# Patient Record
Sex: FEMALE | Race: WHITE | URBAN - METROPOLITAN AREA
[De-identification: names, ages, dates, MRNs, and addresses within clinical notes are randomized per-mention and may not be internally consistent; named-entity substitution may affect disease eponyms.]

---

## 2017-01-25 ENCOUNTER — TELEPHONE (OUTPATIENT)
Dept: CARDIOLOGY CLINIC | Age: 57
End: 2017-01-25

## 2017-05-08 ENCOUNTER — TELEPHONE (OUTPATIENT)
Dept: CARDIOLOGY CLINIC | Age: 57
End: 2017-05-08

## 2017-10-16 RX ORDER — ISOSORBIDE MONONITRATE 30 MG/1
TABLET, EXTENDED RELEASE ORAL
Qty: 45 TABLET | Refills: 3 | Status: SHIPPED | OUTPATIENT
Start: 2017-10-16 | End: 2018-10-10 | Stop reason: SDUPTHER

## 2017-11-08 ENCOUNTER — OFFICE VISIT (OUTPATIENT)
Dept: CARDIOLOGY CLINIC | Age: 57
End: 2017-11-08

## 2017-11-08 VITALS
WEIGHT: 186.6 LBS | SYSTOLIC BLOOD PRESSURE: 130 MMHG | DIASTOLIC BLOOD PRESSURE: 80 MMHG | BODY MASS INDEX: 33.06 KG/M2 | HEART RATE: 68 BPM | OXYGEN SATURATION: 95 % | HEIGHT: 63 IN

## 2017-11-08 DIAGNOSIS — E78.00 PURE HYPERCHOLESTEROLEMIA: ICD-10-CM

## 2017-11-08 DIAGNOSIS — Z01.810 PREOP CARDIOVASCULAR EXAM: ICD-10-CM

## 2017-11-08 DIAGNOSIS — I25.10 CORONARY ARTERY DISEASE INVOLVING NATIVE CORONARY ARTERY OF NATIVE HEART WITHOUT ANGINA PECTORIS: Primary | ICD-10-CM

## 2017-11-08 PROCEDURE — 99214 OFFICE O/P EST MOD 30 MIN: CPT | Performed by: INTERNAL MEDICINE

## 2017-11-08 PROCEDURE — 93000 ELECTROCARDIOGRAM COMPLETE: CPT | Performed by: INTERNAL MEDICINE

## 2017-11-08 RX ORDER — MELOXICAM 15 MG/1
15 TABLET ORAL DAILY
COMMUNITY
End: 2018-03-21

## 2017-11-08 RX ORDER — ALBUTEROL SULFATE 90 UG/1
2 AEROSOL, METERED RESPIRATORY (INHALATION) EVERY 6 HOURS PRN
COMMUNITY

## 2017-11-08 RX ORDER — MECLIZINE HYDROCHLORIDE 25 MG/1
25 TABLET ORAL 3 TIMES DAILY PRN
Qty: 210 TABLET | Refills: 1 | Status: SHIPPED | OUTPATIENT
Start: 2017-11-08 | End: 2017-11-15

## 2017-11-08 ASSESSMENT — ENCOUNTER SYMPTOMS
WHEEZING: 0
NAUSEA: 1
COUGH: 0
EYE PAIN: 0
VOMITING: 1
EYE REDNESS: 0
ABDOMINAL PAIN: 0
CHEST TIGHTNESS: 0
SHORTNESS OF BREATH: 0
COLOR CHANGE: 0
BLOOD IN STOOL: 0

## 2017-11-08 NOTE — PATIENT INSTRUCTIONS
Patient Education        Vertigo: Care Instructions  Your Care Instructions  Vertigo is the feeling that you or your surroundings are moving when there is no actual movement. It is often described as a feeling of spinning, whirling, falling, or tilting. Vertigo may make you vomit or feel nauseated. You may have trouble standing or walking and may lose your balance. Vertigo is often related to an inner ear problem, but it can have other more serious causes. If vertigo continues, you may need more tests to find its cause. Follow-up care is a key part of your treatment and safety. Be sure to make and go to all appointments, and call your doctor if you are having problems. Its also a good idea to know your test results and keep a list of the medicines you take. How can you care for yourself at home? · Do not lie flat on your back. Prop yourself up slightly. This may reduce the spinning feeling. Keep your eyes open. · Move slowly so that you do not fall. · If your doctor recommends medicine, take it exactly as directed. · Do not drive while you are having vertigo. Certain exercises, called Guillermo-Daroff exercises, can help decrease vertigo. To do Guillermo-Daroff exercises:  · Sit on the edge of a bed or sofa and quickly lie down on the side that causes the worst vertigo. Lie on your side with your ear down. · Stay in this position for at least 30 seconds or until the vertigo goes away. · Sit up. If this causes vertigo, wait for it to stop. · Repeat the procedure on the other side. · Repeat this 10 times. Do these exercises 2 times a day until the vertigo is gone. When should you call for help? Call 911 anytime you think you may need emergency care. For example, call if:  · You passed out (lost consciousness). · You have symptoms of a stroke. These may include:  ¨ Sudden numbness, tingling, weakness, or loss of movement in your face, arm, or leg, especially on only one side of your body.   ¨ Sudden vision

## 2017-11-08 NOTE — LETTER
[address] [facility] - [address] [address]  [address]   [address] [address]  Phone: [phone]  Fax: [phone]    Godfrey Sales MD        November 20, 2017     Bisi Villalobos MD  [address] [place] [address] Road  [facility] [address]    Patient: Olga De La Cruz  MR Number: [id number]  YOB: 1960  Date of Visit: 11/8/2017    Dear Dr. Bisi Villalobos:    HPI Olga De La Cruz is here for preop risk assessment prior to having foot sx. While in the office today she felt dizzy,  nauseated, vomited and felt as if she was going to pass out. She states that the room felt as if it was spinning. She states that this morning at home while sitting on the bed she felt dizzy and nearly passed out. She denies chest pain, palpitations,  syncope, leg swelling and increased dyspnea. Assessment:       CAD (coronary artery disease)       Cath 11/28/12 mild proximal RCA dz, normal LVE. Echo 9/2015 LVEF normal, no significant valvular dz. GXT 3/2016 no ischemia. Unable to tolerate BB.  Hyperlipidemia       On statin. LDL 64.    Tobacco use disorder      cessation discussed, continues to smoke. Chantix did not help her. Quit smoking 9/2017    Palpitations -  Holter monitor normal 5/11.        stable    Chest pain      Atypical. Chronic. Shortness of breath -stable       Near syncope associated with vomiting. Preop risk assessment for foot surgery. EKG NSR. Dizziness, likely vertigo. No orthostatic changes. Plan:      Clinically stable from cardiac standpoint. May proceed with foot surgery. Considered to be at average risk. Cover with nitrates perioperatively. May stop ASA if indicated 5 days prior to procedure and resume ASAP postoperatively. Will prescribe Antivert for vertigo. Fasting lipid profile, CMP prior to next visit. If you have questions, please do not hesitate to call me. I look forward to following Juancho López along with you.     Sincerely,        Godfrey Sales MD

## 2017-11-08 NOTE — PROGRESS NOTES
perioperatively. May stop ASA if indicated 5 days prior to procedure and resume ASAP postoperatively. Will prescribe Antivert for vertigo. Fasting lipid profile, CMP prior to next visit.

## 2017-11-20 NOTE — COMMUNICATION BODY
HPI Shankar Jasmine is here for preop risk assessment prior to having foot sx. While in the office today she felt dizzy,  nauseated, vomited and felt as if she was going to pass out. She states that the room felt as if it was spinning. She states that this morning at home while sitting on the bed she felt dizzy and nearly passed out. She denies chest pain, palpitations,  syncope, leg swelling and increased dyspnea. Assessment:       CAD (coronary artery disease)       Cath 11/28/12 mild proximal RCA dz, normal LVE. Echo 9/2015 LVEF normal, no significant valvular dz. GXT 3/2016 no ischemia. Unable to tolerate BB.  Hyperlipidemia       On statin. LDL 64.    Tobacco use disorder      cessation discussed, continues to smoke. Chantix did not help her. Quit smoking 9/2017    Palpitations -  Holter monitor normal 5/11.        stable    Chest pain      Atypical. Chronic. Shortness of breath -stable       Near syncope associated with vomiting. Preop risk assessment for foot surgery. EKG NSR. Dizziness, likely vertigo. No orthostatic changes. Plan:      Clinically stable from cardiac standpoint. May proceed with foot surgery. Considered to be at average risk. Cover with nitrates perioperatively. May stop ASA if indicated 5 days prior to procedure and resume ASAP postoperatively. Will prescribe Antivert for vertigo. Fasting lipid profile, CMP prior to next visit.

## 2018-01-19 RX ORDER — ROSUVASTATIN CALCIUM 40 MG/1
40 TABLET, COATED ORAL EVERY EVENING
Qty: 90 TABLET | Refills: 1 | Status: SHIPPED | OUTPATIENT
Start: 2018-01-19 | End: 2018-09-10 | Stop reason: SDUPTHER

## 2018-03-21 ENCOUNTER — OFFICE VISIT (OUTPATIENT)
Dept: CARDIOLOGY CLINIC | Age: 58
End: 2018-03-21

## 2018-03-21 VITALS
BODY MASS INDEX: 34.96 KG/M2 | OXYGEN SATURATION: 97 % | HEART RATE: 76 BPM | SYSTOLIC BLOOD PRESSURE: 112 MMHG | DIASTOLIC BLOOD PRESSURE: 70 MMHG | HEIGHT: 62 IN | WEIGHT: 190 LBS

## 2018-03-21 DIAGNOSIS — E78.1 PURE HYPERGLYCERIDEMIA: ICD-10-CM

## 2018-03-21 DIAGNOSIS — R00.2 PALPITATIONS: ICD-10-CM

## 2018-03-21 DIAGNOSIS — I25.10 CORONARY ARTERY DISEASE INVOLVING NATIVE CORONARY ARTERY OF NATIVE HEART WITHOUT ANGINA PECTORIS: Primary | ICD-10-CM

## 2018-03-21 PROCEDURE — 99214 OFFICE O/P EST MOD 30 MIN: CPT | Performed by: INTERNAL MEDICINE

## 2018-03-21 RX ORDER — MECLIZINE HYDROCHLORIDE 25 MG/1
25 TABLET ORAL 3 TIMES DAILY PRN
COMMUNITY

## 2018-03-21 RX ORDER — NITROGLYCERIN 0.4 MG/1
0.4 TABLET SUBLINGUAL EVERY 5 MIN PRN
Qty: 25 TABLET | Refills: 2 | Status: SHIPPED | OUTPATIENT
Start: 2018-03-21

## 2018-03-21 ASSESSMENT — ENCOUNTER SYMPTOMS
SHORTNESS OF BREATH: 1
WHEEZING: 0
COUGH: 0
EYE REDNESS: 0
BLOOD IN STOOL: 0
CHEST TIGHTNESS: 0
COLOR CHANGE: 0
ABDOMINAL PAIN: 0
EYE PAIN: 0

## 2018-03-21 NOTE — PATIENT INSTRUCTIONS
Patient Education        High Cholesterol: Care Instructions  Your Care Instructions    Cholesterol is a type of fat in your blood. It is needed for many body functions, such as making new cells. Cholesterol is made by your body. It also comes from food you eat. High cholesterol means that you have too much of the fat in your blood. This raises your risk of a heart attack and stroke. LDL and HDL are part of your total cholesterol. LDL is the \"bad\" cholesterol. High LDL can raise your risk for heart disease, heart attack, and stroke. HDL is the \"good\" cholesterol. It helps clear bad cholesterol from the body. High HDL is linked with a lower risk of heart disease, heart attack, and stroke. Your cholesterol levels help your doctor find out your risk for having a heart attack or stroke. You and your doctor can talk about whether you need to lower your risk and what treatment is best for you. A heart-healthy lifestyle along with medicines can help lower your cholesterol and your risk. The way you choose to lower your risk will depend on how high your risk is for heart attack and stroke. It will also depend on how you feel about taking medicines. Follow-up care is a key part of your treatment and safety. Be sure to make and go to all appointments, and call your doctor if you are having problems. It's also a good idea to know your test results and keep a list of the medicines you take. How can you care for yourself at home? · Eat a variety of foods every day. Good choices include fruits, vegetables, whole grains (like oatmeal), dried beans and peas, nuts and seeds, soy products (like tofu), and fat-free or low-fat dairy products. · Replace butter, margarine, and hydrogenated or partially hydrogenated oils with olive and canola oils. (Canola oil margarine without trans fat is fine.)  · Replace red meat with fish, poultry, and soy protein (like tofu).   · Limit processed and packaged foods like chips, crackers, and cookies. · Bake, broil, or steam foods. Don't escamilla them. · Be physically active. Get at least 30 minutes of exercise on most days of the week. Walking is a good choice. You also may want to do other activities, such as running, swimming, cycling, or playing tennis or team sports. · Stay at a healthy weight or lose weight by making the changes in eating and physical activity listed above. Losing just a small amount of weight, even 5 to 10 pounds, can reduce your risk for having a heart attack or stroke. · Do not smoke. When should you call for help? Watch closely for changes in your health, and be sure to contact your doctor if:  ? · You need help making lifestyle changes. ? · You have questions about your medicine. Where can you learn more? Go to [email or web address] and sign in to your [employer] account. Enter B603 in the [employer] box to learn more about \"High Cholesterol: Care Instructions. \"     If you do not have an account, please click on the \"Sign Up Now\" link. Current as of: September 21, 2016  Content Version: 11.5  © 0558-4583 Healthwise, Incorporated. Care instructions adapted under license by [facility] ([facility]). If you have questions about a medical condition or this instruction, always ask your healthcare professional. [address] any warranty or liability for your use of this information.

## 2018-03-21 NOTE — LETTER
All other systems reviewed and are negative. Assessment:       CAD (coronary artery disease)       Cath 2008 at [facility] showed 60% blockage per pt. Cath 11/28/12 mild proximal RCA dz, normal LVE. Echo 9/2015 LVEF normal, no significant valvular dz. GXT 3/2016 no ischemia. Unable to tolerate BB. Nuc GXT 3/2018 normal. Echo 3/2018 normal.    Hyperlipidemia       On statin. No recent lipids    Tobacco use disorder      cessation discussed, continues to smoke. Chantix did not help her. Quit smoking 9/2017    Palpitations -  Holter monitor normal 5/11.        stable    Chest pain      Atypical. Chronic. CTA chest 3/2018 no PE        Shortness of breath -stable       Near syncope associated with vomiting. Dizziness, likely vertigo. No orthostatic changes. Plan:      Clinically stable. No evidence of CHF. No exertional angina. Atypical chest pain. Recent stress test showed no ischemia. Continue ASA and statin. Risk factor modification also discussed. Advised to call if she has exertional angina once able to ambulate more. Will consider angiogram if she has exertional symptoms. Fasting lipid profile, CMP prior to next visit. May reduce Imdur dosage if SBP <90mmHg. If you have questions, please do not hesitate to call me. I look forward to following Charles Mcmanus along with you.     Sincerely,        Arthur Pollard MD

## 2018-04-12 PROBLEM — Z01.810 PREOP CARDIOVASCULAR EXAM: Status: RESOLVED | Noted: 2017-11-08 | Resolved: 2018-04-12

## 2018-04-18 NOTE — COMMUNICATION BODY
HPI. Leigh Ann Vicente is here for f/u of her recent hospitalization for chest pain. Stress test and echo were normal. Today she denies exertional chest pain, palpitations, dizziness, syncope, leg swelling and increased dyspnea. She continues to have chest discomfort but less severe after discharged from the hospital. Her chest discomfort is described as a \"pressure and squeezing\" that occurs 3-4 times a day and lasts <1-2 minutes. The discomfort occurs randomly. She recently started walking again and is no longer using the wheelchair. She continues to wear a boot on her right foot. She does not think that she has chest pain when walking. She states that her PCP wants her to have an angiogram.     Review of Systems   Constitutional: Negative for activity change, appetite change, diaphoresis and fatigue. HENT: Negative for nosebleeds and tinnitus. Eyes: Negative for pain and redness. Respiratory: Positive for shortness of breath. Negative for cough, chest tightness and wheezing. Cardiovascular: Positive for chest pain. Negative for palpitations and leg swelling. Gastrointestinal: Negative for abdominal pain and blood in stool. Genitourinary: Negative for difficulty urinating, hematuria, pelvic pain and vaginal bleeding. Musculoskeletal: Negative for joint swelling, myalgias and neck pain. Skin: Negative for color change and pallor. Neurological: Negative for dizziness, syncope, numbness and headaches. Hematological: Does not bruise/bleed easily. Psychiatric/Behavioral: Negative for confusion and decreased concentration. All other systems reviewed and are negative. Assessment:       CAD (coronary artery disease)       Cath 2008 at [facility] showed 60% blockage per pt. Cath 11/28/12 mild proximal RCA dz, normal LVE. Echo 9/2015 LVEF normal, no significant valvular dz. GXT 3/2016 no ischemia. Unable to tolerate BB.  Nuc GXT 3/2018 normal. Echo 3/2018 normal.    Hyperlipidemia       On statin. No recent lipids    Tobacco use disorder      cessation discussed, continues to smoke. Chantix did not help her. Quit smoking 9/2017    Palpitations -  Holter monitor normal 5/11.        stable    Chest pain      Atypical. Chronic. CTA chest 3/2018 no PE        Shortness of breath -stable       Near syncope associated with vomiting. Dizziness, likely vertigo. No orthostatic changes. Plan:      Clinically stable. No evidence of CHF. No exertional angina. Atypical chest pain. Recent stress test showed no ischemia. Continue ASA and statin. Risk factor modification also discussed. Advised to call if she has exertional angina once able to ambulate more. Will consider angiogram if she has exertional symptoms. Fasting lipid profile, CMP prior to next visit. May reduce Imdur dosage if SBP <90mmHg.

## 2018-07-25 ENCOUNTER — OFFICE VISIT CONVERTED (OUTPATIENT)
Dept: SURGERY | Facility: CLINIC | Age: 58
End: 2018-07-25
Attending: SURGERY

## 2018-09-10 RX ORDER — ROSUVASTATIN CALCIUM 40 MG/1
40 TABLET, COATED ORAL EVERY EVENING
Qty: 90 TABLET | Refills: 3 | Status: SHIPPED | OUTPATIENT
Start: 2018-09-10 | End: 2020-02-28 | Stop reason: SDUPTHER

## 2018-10-10 RX ORDER — ISOSORBIDE MONONITRATE 30 MG/1
TABLET, EXTENDED RELEASE ORAL
Qty: 45 TABLET | Refills: 3 | Status: SHIPPED | OUTPATIENT
Start: 2018-10-10

## 2019-03-21 ENCOUNTER — HOSPITAL ENCOUNTER (OUTPATIENT)
Dept: GENERAL RADIOLOGY | Facility: HOSPITAL | Age: 59
Discharge: HOME | End: 2019-03-21
Attending: SURGERY

## 2019-03-25 ENCOUNTER — OFFICE VISIT CONVERTED (OUTPATIENT)
Dept: SURGERY | Facility: CLINIC | Age: 59
End: 2019-03-25
Attending: SURGERY

## 2020-02-28 RX ORDER — ROSUVASTATIN CALCIUM 40 MG/1
40 TABLET, COATED ORAL EVERY EVENING
Qty: 90 TABLET | Refills: 0 | Status: SHIPPED | OUTPATIENT
Start: 2020-02-28

## 2020-07-15 ENCOUNTER — HOSPITAL ENCOUNTER (OUTPATIENT)
Dept: GENERAL RADIOLOGY | Facility: HOSPITAL | Age: 60
Discharge: HOME | End: 2020-07-15
Attending: FAMILY MEDICINE

## 2020-07-22 ENCOUNTER — OFFICE VISIT CONVERTED (OUTPATIENT)
Dept: SURGERY | Facility: CLINIC | Age: 60
End: 2020-07-22
Attending: SURGERY

## 2020-07-22 ENCOUNTER — CONVERSION ENCOUNTER (OUTPATIENT)
Dept: SURGERY | Facility: CLINIC | Age: 60
End: 2020-07-22

## 2021-05-10 NOTE — H&P
"   History and Physical      Patient Name: Kajal Mixon   Patient ID: [id number]   Sex: Female   YOB: 1960    Primary Care Provider: Tiffany Anderson MD   Referring Provider: Tiffany Anderson MD    Visit Date: July 22, 2020    Provider: Kajal Latham MD   Location[facility]   Location Address: [address] [place] [address]  [id number]   Location Phone: [phone]          Chief Complaint  · Annual exam      History Of Present Illness  Kajal Mixon is a 59 year old /Black female who is referred from Tiffany Anderson MD ; very pleasant lady here with improved left breast pain and followup from annual mammogram. her annual imaging was benign with no masses or lesions.             Past Medical History  Breast cyst; Breast nodule         Past Surgical History  *Denies any surgical procedures         Allergy List  Latex       Allergies Reconciled  Family Medical History  *No Known Family History         Social History  Tobacco (Never)         Review of Systems  · Constitutional  o Denies  o : fever, chills  · Breasts  o * See HPI  · Cardiovascular  o Denies  o : chest pain, cardiac murmurs, irregular heart beats, dyspnea on exertion  · Integument  o Denies  o : rash, itching, new skin lesions  · Heme-Lymph  o Denies  o : easy bleeding, easy bruising, lymph node enlargement or tenderness      Vitals  Date Time BP Position Site L\R Cuff Size HR RR TEMP (F) WT  HT  BMI kg/m2 BSA m2 O2 Sat        07/22/2020 09:03 AM       14  173lbs 0oz 5'  4\" 29.7 1.88           Physical Examination  · Constitutional  o Appearance  o : A well-nourished, well-developed patient who ambulates without difficulty, Alert and Oriented X3.  · Respiratory  o Respiratory Effort  o : breathing unlabored  o Inspection of Chest  o : breaths equal bilaterally  · Breasts  o Inspection of Breasts  o : developmental state normal for age  o Palpation of Breasts, Axillae  o : no masses or tenderness noted on palpation, " no skin ornipple changes          Assessment  · Annual physical exam     V70.0/Z00.00      Plan  · Medications  o Medications have been Reconciled  o Transition of Care or Provider Policy  · Instructions  o PLAN:  o Routine screening mammogram in one year  · Referrals  o ID: [id number] Date: 07/20/2020 Type: Inbound  Specialty: General Surgery            Electronically Signed by: Kajal Latham MD -Author on July 22, 2020 09:08:51 AM

## 2021-05-15 VITALS — BODY MASS INDEX: 29.53 KG/M2 | RESPIRATION RATE: 14 BRPM | WEIGHT: 173 LBS | HEIGHT: 64 IN

## 2021-05-15 VITALS — RESPIRATION RATE: 14 BRPM | BODY MASS INDEX: 29.88 KG/M2 | WEIGHT: 175 LBS | HEIGHT: 64 IN

## 2021-05-16 VITALS — BODY MASS INDEX: 29.71 KG/M2 | RESPIRATION RATE: 16 BRPM | WEIGHT: 174 LBS | HEIGHT: 64 IN

## 2021-05-20 ENCOUNTER — OFFICE VISIT CONVERTED (OUTPATIENT)
Dept: SURGERY | Facility: CLINIC | Age: 61
End: 2021-05-20
Attending: NURSE PRACTITIONER

## 2021-05-20 ENCOUNTER — CONVERSION ENCOUNTER (OUTPATIENT)
Dept: SURGERY | Facility: CLINIC | Age: 61
End: 2021-05-20

## 2021-06-05 NOTE — H&P
History and Physical      Patient Name: Kajal Mixon   Patient ID: [id number]   Sex: Female   YOB: 1960    Primary Care Provider: Tiffany Anderson MD   Referring Provider: Tiffany Anderson MD    Visit Date: May 20, 2021    Provider: ERNA Manuel   Location: [facility]   Location Address: [address] [place] [address]  [id number]   Location Phone: [phone]          Chief Complaint  · Requesting colonoscopy  · Age 50 or over  · Blood in Stool  · Constipation  · Abdominal Pain      History Of Present Illness  The patient is a 60 year old /Black female presenting [facility] on a referral from Tiffany Anderson MD.   Kajal Mixon needs to have a diagnostic colonoscopy.   Patient states that they have had a colonoscopy. 11 years ago   Patient currently complains of: constipation, blood in stool, and abdominal pain   Patient Does not have family history of colon cancer.      Patient presents today on a referral from Dr. Tiffany Anderson for a blood in stool, constipation and lower abdominal pain. Patient reports that she has always battled constipation, and now is with some lower abdominal pain and seeing some BRBPR. Patient denies any family history of colorectal cancer.     Reports that her last colon was 11 years ago at [place] and she believes it was normal.       Past Medical History  Disease Name Date Onset Notes   Breast cyst --  --    Breast nodule --  --    Chest pain --  --    GERD --  --          Past Surgical History  Procedure Name Date Notes   *Denies any surgical procedures --  --    Colonoscopy --  --          Medication List  Name Date Started Instructions   Excedrin Migraine 250-250-65 mg oral tablet  take 1 tablet by oral route once daily as needed.   Fioricet -40 mg oral capsule  take 2 capsules by oral route every 4 hours as needed not to exceed 6 capsules per 24hrs   NuLYTELY with Flavor Packs 420 gram oral recon soln  "05/20/2021 take as directed   terbinafine 250 mg oral  take one tablet daily orally for 84 days.         Allergy List  Allergen Name Date Reaction Notes   Latex --  --  --    Latex Exam Gloves --  --  --        Allergies Reconciled  Family Medical History  Disease Name Relative/Age Notes   Diabetes, unspecified type Father/  Grandfather (paternal)/   Father; Grandfather (paternal)   *No Known Family History  --    No family history of colorectal cancer  --          Social History  Finding Status Start/Stop Quantity Notes   Alcohol Current some day --/-- --  05/20/2021 - wine occasionally   Tobacco Never --/-- --  --          Review of Systems  · Constitutional  o Denies  o : fever, chills  · Eyes  o Denies  o : yellowish discoloration of eyes  · HENT  o Denies  o : difficulty swallowing  · Cardiovascular  o Denies  o : chest pain, chest pain on exertion  · Respiratory  o Denies  o : shortness of breath  · Gastrointestinal  o Admits  o : constipation, abdominal pain, blood in stools  o Denies  o : nausea, vomiting, diarrhea  · Genitourinary  o Denies  o : abnormal color of urine  · Integument  o Denies  o : rash  · Neurologic  o Denies  o : tingling or numbness  · Musculoskeletal  o Denies  o : joint pain  · Endocrine  o Denies  o : weight gain, weight loss      Vitals  Date Time BP Position Site L\R Cuff Size HR RR TEMP (F) WT  HT  BMI kg/m2 BSA m2 O2 Sat FR L/min FiO2 HC       05/20/2021 10:16 AM       13  180lbs 8oz 5'  3.5\" 31.47 1.92             Physical Examination  · Constitutional  o Appearance  o : well developed, well-nourished, patient in no apparent distress  · Head and Face  o Head  o :   § Inspection  § : atraumatic, normocephalic  o Face  o :   § Inspection  § : no facial lesions  · Eyes  o Conjunctivae  o : conjunctivae normal  o Sclerae  o : sclerae white  · Neck  o Inspection/Palpation  o : normal appearance, no masses or tenderness, trachea midline  · Respiratory  o Respiratory Effort  o : " breathing unlabored  · Skin and Subcutaneous Tissue  o General Inspection  o : no lesions present, no areas of discoloration, skin turgor normal, texture normal  · Neurologic  o Mental Status Examination  o :   § Orientation  § : grossly oriented to person, place and time  § Attention  § : attention normal, concentration abilities normal  § Fund of Knowledge  § : fund of knowledge within normal limits, patient aware of current events  o Gait and Station  o : normal gait, able to stand without difficulty  · Psychiatric  o Judgement and Insight  o : judgment and insight intact  o Mood and Affect  o : mood normal, affect appropriate              Assessment  · Blood in the stool     578.1/K92.1  · Constipation     564.00/K59.00  · Abdominal pain, lower     789.09/R10.30    Problems Reconciled  Plan  · Orders  o Consent for Colonoscopy with Possible Biopsy - Possible risks/complications, benefits, and alternatives to surgical or invasive procedure have been explained to patient and/or legal guardian. -Patient has been evaluated and can tolerate anesthesia and/or sedation. Risks, benefits, and alternatives to anesthesia and sedation have been explained to patient and/or legal guardian. ([id number]) - 578.1/K92.1, 564.00/K59.00, 789.09/R10.30 - 06/17/2021  · Medications  o Medications have been Reconciled  o Transition of Care or Provider Policy  · Instructions  o Surgical Facility: [facility]  o Handouts Provided Pre-Procedure Instructions including date, time, and location of procedure.   o PLAN: Proceeed with colonoscopy. Patient understands risks/benefits and is willing to proceed.   o ***Surgical Orders***  o RISK AND BENEFITS:  o Given these options, the patient has verbally expressed an understanding of the risks of the surgery and finds these risks acceptable. Will proceed with surgery as soon as possible.  o O.R. PREP: Per protocol   o IV: Per Anesthesia  o Please sign permit for: Colonoscopy with  possible biopsies by Dr. Rolon.  o The above History and Physical Examination has been completed within 30 days of admission.  o ***Patient Status***  o Outpatient  o Follow up in the in the office post procedure.  o Electronically Identified Patient Education Materials Provided Electronically  · Disposition  o Call or Return if symptoms worsen or persist.  o EMR dragon/transcription disclaimer: Much of this encounter note is an electronic transcription/translation of spoken language to printed text. Electronic translation of spoken language may permit erroneous, or at times nonsensical words or phrases to be inadvertently trasncribed; although I have reviewed the note for such errors, some may still exist.            Electronically Signed by: ERNA Manuel -Author on May 20, 2021 12:39:59 PM

## 2021-06-07 ENCOUNTER — TELEPHONE (OUTPATIENT)
Dept: SURGERY | Facility: CLINIC | Age: 61
End: 2021-06-07

## 2021-06-07 NOTE — TELEPHONE ENCOUNTER
Insurance not coverd for Dr. Rolon to do procedure. Pt r/s to Dr. Hackett on 7/8/21 called and spoke to Carlie in scheduling. Pt is aware

## 2021-06-18 ENCOUNTER — PREP FOR SURGERY (OUTPATIENT)
Dept: OTHER | Facility: HOSPITAL | Age: 61
End: 2021-06-18

## 2021-06-18 DIAGNOSIS — R10.9 ABDOMINAL PAIN: ICD-10-CM

## 2021-06-18 DIAGNOSIS — K92.1 BLOOD IN STOOL: ICD-10-CM

## 2021-06-18 DIAGNOSIS — K59.00 CONSTIPATION: Primary | ICD-10-CM

## 2021-06-18 RX ORDER — SODIUM CHLORIDE 0.9 % (FLUSH) 0.9 %
10 SYRINGE (ML) INJECTION AS NEEDED
Status: CANCELLED | OUTPATIENT
Start: 2021-06-18

## 2021-06-18 RX ORDER — SODIUM CHLORIDE 0.9 % (FLUSH) 0.9 %
3 SYRINGE (ML) INJECTION EVERY 12 HOURS SCHEDULED
Status: CANCELLED | OUTPATIENT
Start: 2021-06-18

## 2021-06-26 PROBLEM — N63.0 BREAST NODULE: Status: ACTIVE | Noted: 2021-06-26

## 2021-06-26 PROBLEM — N60.09 BREAST CYST: Status: ACTIVE | Noted: 2021-06-26

## 2021-07-06 RX ORDER — BUTALBITAL, ACETAMINOPHEN AND CAFFEINE 300; 40; 50 MG/1; MG/1; MG/1
CAPSULE ORAL
COMMUNITY
End: 2022-05-11

## 2021-07-06 RX ORDER — FLUTICASONE PROPIONATE 50 MCG
2 SPRAY, SUSPENSION (ML) NASAL DAILY
COMMUNITY

## 2021-07-08 ENCOUNTER — ANESTHESIA EVENT (OUTPATIENT)
Dept: GASTROENTEROLOGY | Facility: HOSPITAL | Age: 61
End: 2021-07-08

## 2021-07-08 ENCOUNTER — ANESTHESIA (OUTPATIENT)
Dept: GASTROENTEROLOGY | Facility: HOSPITAL | Age: 61
End: 2021-07-08

## 2021-07-08 ENCOUNTER — HOSPITAL ENCOUNTER (OUTPATIENT)
Facility: HOSPITAL | Age: 61
Setting detail: HOSPITAL OUTPATIENT SURGERY
Discharge: HOME | End: 2021-07-08
Attending: SURGERY | Admitting: SURGERY

## 2021-07-08 VITALS
DIASTOLIC BLOOD PRESSURE: 85 MMHG | OXYGEN SATURATION: 99 % | SYSTOLIC BLOOD PRESSURE: 139 MMHG | HEIGHT: 63 IN | HEART RATE: 61 BPM | RESPIRATION RATE: 17 BRPM | TEMPERATURE: 97 F | BODY MASS INDEX: 30.86 KG/M2 | WEIGHT: 174.16 LBS

## 2021-07-08 DIAGNOSIS — R10.9 ABDOMINAL PAIN: ICD-10-CM

## 2021-07-08 DIAGNOSIS — K59.00 CONSTIPATION: ICD-10-CM

## 2021-07-08 DIAGNOSIS — K92.1 BLOOD IN STOOL: ICD-10-CM

## 2021-07-08 PROCEDURE — 25010000002 PROPOFOL 10 MG/ML EMULSION: Performed by: NURSE ANESTHETIST, CERTIFIED REGISTERED

## 2021-07-08 RX ORDER — SODIUM CHLORIDE 0.9 % (FLUSH) 0.9 %
3 SYRINGE (ML) INJECTION EVERY 12 HOURS SCHEDULED
Status: DISCONTINUED | OUTPATIENT
Start: 2021-07-08 | End: 2021-07-08 | Stop reason: HOSPADM

## 2021-07-08 RX ORDER — LIDOCAINE HYDROCHLORIDE 20 MG/ML
INJECTION, SOLUTION INFILTRATION; PERINEURAL AS NEEDED
Status: DISCONTINUED | OUTPATIENT
Start: 2021-07-08 | End: 2021-07-08 | Stop reason: SURG

## 2021-07-08 RX ORDER — SODIUM CHLORIDE 0.9 % (FLUSH) 0.9 %
10 SYRINGE (ML) INJECTION AS NEEDED
Status: DISCONTINUED | OUTPATIENT
Start: 2021-07-08 | End: 2021-07-08 | Stop reason: HOSPADM

## 2021-07-08 RX ORDER — SODIUM CHLORIDE, SODIUM LACTATE, POTASSIUM CHLORIDE, CALCIUM CHLORIDE 600; 310; 30; 20 MG/100ML; MG/100ML; MG/100ML; MG/100ML
30 INJECTION, SOLUTION INTRAVENOUS CONTINUOUS
Status: DISCONTINUED | OUTPATIENT
Start: 2021-07-08 | End: 2021-07-08 | Stop reason: HOSPADM

## 2021-07-08 RX ADMIN — SODIUM CHLORIDE, POTASSIUM CHLORIDE, SODIUM LACTATE AND CALCIUM CHLORIDE 30 ML/HR: 600; 310; 30; 20 INJECTION, SOLUTION INTRAVENOUS at 12:30

## 2021-07-08 RX ADMIN — LIDOCAINE HYDROCHLORIDE 50 MG: 20 INJECTION, SOLUTION INFILTRATION; PERINEURAL at 13:22

## 2021-07-08 RX ADMIN — PROPOFOL 250 MCG/KG/MIN: 10 INJECTION, EMULSION INTRAVENOUS at 13:21

## 2021-07-08 NOTE — H&P
Chief Complaint  · Requesting colonoscopy  · Age 50 or over  · Blood in Stool  · Constipation  · Abdominal Pain       History Of Present Illness  The patient is a 60 year old /Black female presenting to the Surgical Specialist office on a referral from Tiffany Anderson MD.   Kajal Mixon needs to have a diagnostic colonoscopy.   Patient states that they have had a colonoscopy. 11 years ago   Patient currently complains of: constipation, blood in stool, and abdominal pain   Patient Does not have family history of colon cancer.       Patient presents today on a referral from Dr. Tiffany Anderson for a blood in stool, constipation and lower abdominal pain. Patient reports that she has always battled constipation, and now is with some lower abdominal pain and seeing some BRBPR. Patient denies any family history of colorectal cancer.     Reports that her last colon was 11 years ago at [facility] and she believes it was normal.        Past Medical History  Disease Name Date Onset Notes   Breast cyst --  --    Breast nodule --  --    Chest pain --  --    GERD --  --            Past Surgical History  Procedure Name Date Notes   *Denies any surgical procedures --  --    Colonoscopy --  --            Medication List  Name Date Started Instructions   Excedrin Migraine 250-250-65 mg oral tablet   take 1 tablet by oral route once daily as needed.   Fioricet -40 mg oral capsule   take 2 capsules by oral route every 4 hours as needed not to exceed 6 capsules per 24hrs   NuLYTELY with Flavor Packs 420 gram oral recon soln 05/20/2021 take as directed   terbinafine 250 mg oral   take one tablet daily orally for 84 days.           Allergy List  Allergen Name Date Reaction Notes   Latex --  --  --    Latex Exam Gloves --  --  --         Allergies Reconciled  Family Medical History  Disease Name Relative/Age Notes   Diabetes, unspecified type Father/  Grandfather (paternal)/    Father; Grandfather (paternal)   *No Known  "Family History   --    No family history of colorectal cancer   --            Social History  Finding Status Start/Stop Quantity Notes   Alcohol Current some day --/-- --  05/20/2021 - wine occasionally   Tobacco Never --/-- --  --            Review of Systems  · Constitutional  · Denies  · : fever, chills  · Eyes  · Denies  · : yellowish discoloration of eyes  · HENT  · Denies  · : difficulty swallowing  · Cardiovascular  · Denies  · : chest pain, chest pain on exertion  · Respiratory  · Denies  · : shortness of breath  · Gastrointestinal  · Admits  · : constipation, abdominal pain, blood in stools  · Denies  · : nausea, vomiting, diarrhea  · Genitourinary  · Denies  · : abnormal color of urine  · Integument  · Denies  · : rash  · Neurologic  · Denies  · : tingling or numbness  · Musculoskeletal  · Denies  · : joint pain  · Endocrine  · Denies  · : weight gain, weight loss       Vitals                                       Date Time BP Position Site L\R Cuff Size HR RR TEMP (F) WT  HT  BMI kg/m2 BSA m2 O2 Sat FR L/min FiO2 HC        05/20/2021 10:16 AM             13   180lbs 8oz 5'  3.5\" 31.47 1.92                   Physical Examination  · Constitutional  · Appearance  · : well developed, well-nourished, patient in no apparent distress  · Head and Face  · Head  · :   · Inspection  · : atraumatic, normocephalic  · Face  · :   · Inspection  · : no facial lesions  · Eyes  · Conjunctivae  · : conjunctivae normal  · Sclerae  · : sclerae white  · Neck  · Inspection/Palpation  · : normal appearance, no masses or tenderness, trachea midline  · Respiratory  · Respiratory Effort  · : breathing unlabored  · Skin and Subcutaneous Tissue  · General Inspection  · : no lesions present, no areas of discoloration, skin turgor normal, texture normal  · Neurologic  · Mental Status Examination  · :   · Orientation  · : grossly oriented to person, place and time  · Attention  · : attention normal, concentration abilities " normal  · Fund of Knowledge  · : fund of knowledge within normal limits, patient aware of current events  · Gait and Station  · : normal gait, able to stand without difficulty  · Psychiatric  · Judgement and Insight  · : judgment and insight intact  · Mood and Affect  · : mood normal, affect appropriate                 Assessment  · Blood in the stool     578.1/K92.1  · Constipation     564.00/K59.00  · Abdominal pain, lower     789.09/R10.30    Problems Reconciled  Plan  · Orders  · Consent for Colonoscopy with Possible Biopsy - Possible risks/complications, benefits, and alternatives to surgical or invasive procedure have been explained to patient and/or legal guardian. -Patient has been evaluated and can tolerate anesthesia and/or sedation. Risks, benefits, and alternatives to anesthesia and sedation have been explained to patient and/or legal guardian. (80001) - 578.1/K92.1, 564.00/K59.00, 789.09/R10.30 - 06/17/2021  · Medications  · Medications have been Reconciled  · Transition of Care or Provider Policy  · Instructions  · Surgical Facility: [facility]  · Handouts Provided Pre-Procedure Instructions including date, time, and location of procedure.   · PLAN: Proceeed with colonoscopy. Patient understands risks/benefits and is willing to proceed.   · Surgical Orders  · RISK AND BENEFITS:  · Given these options, the patient has verbally expressed an understanding of the risks of the surgery and finds these risks acceptable. Will proceed with surgery as soon as possible.  · O.R. PREP: Per protocol   · IV: Per Anesthesia  · Please sign permit for: Colonoscopy with possible biopsies by Dr. Rolon.  · The above History and Physical Examination has been completed within 30 days of admission.  · Patient Status  · Outpatient  · Follow up in the in the office post procedure.  · Electronically Identified Patient Education Materials Provided Electronically  · Disposition  · Call or Return if symptoms worsen  or persist.  · EMR dragon/transcription disclaimer: Much of this encounter note is an electronic transcription/translation of spoken language to printed text. Electronic translation of spoken language may permit erroneous, or at times nonsensical words or phrases to be inadvertently trasncribed; although I have reviewed the note for such errors, some may still exist.         No changes

## 2021-07-08 NOTE — ANESTHESIA PREPROCEDURE EVALUATION
Anesthesia Evaluation     Patient summary reviewed and Nursing notes reviewed   no history of anesthetic complications:  NPO Solid Status: > 8 hours  NPO Liquid Status: > 2 hours           Airway   Mallampati: II  TM distance: >3 FB  Neck ROM: full  No difficulty expected  Dental      Pulmonary - negative pulmonary ROS and normal exam    breath sounds clear to auscultation  Cardiovascular - negative cardio ROS and normal exam  Exercise tolerance: good (4-7 METS)    Rhythm: regular        Neuro/Psych  (+) headaches,     GI/Hepatic/Renal/Endo    (+)  GERD well controlled,      Musculoskeletal (-) negative ROS    Abdominal    Substance History - negative use     OB/GYN negative ob/gyn ROS         Other - negative ROS                       Anesthesia Plan    ASA 2     general and MAC   (Patient understands anesthesia not responsible for dental damage.)  intravenous induction     Anesthetic plan, all risks, benefits, and alternatives have been provided, discussed and informed consent has been obtained with: patient.  Use of blood products discussed with patient .   Plan discussed with CRNA.

## 2021-07-08 NOTE — ANESTHESIA POSTPROCEDURE EVALUATION
Patient: Kajal Mixno    Procedure Summary     Date: 07/08/21 Room / Location: [facility] ENDOSCOPY 1 / [facility] ENDOSCOPY    Anesthesia Start: 1321 Anesthesia Stop: 1406    Procedure: COLONOSCOPY (N/A ) Diagnosis:       Constipation      Abdominal pain      Blood in stool      (Constipation [K59.00])      (Abdominal pain [R10.9])      (Blood in stool [K92.1])    Surgeons: Bc Hackett MD Provider: Hussein Del Real MD    Anesthesia Type: general, MAC ASA Status: 2          Anesthesia Type: general, MAC    Vitals  Vitals Value Taken Time   /85 07/08/21 1422   Temp 36.1 °C (97 °F) 07/08/21 1420   Pulse 61 07/08/21 1425   Resp 17 07/08/21 1420   SpO2 99 % 07/08/21 1425   Vitals shown include unvalidated device data.        Post Anesthesia Care and Evaluation    Patient location during evaluation: bedside  Patient participation: complete - patient participated  Level of consciousness: awake  Pain score: 0  Pain management: adequate  Airway patency: patent  Anesthetic complications: No anesthetic complications  PONV Status: none  Cardiovascular status: acceptable and stable  Respiratory status: acceptable and room air  Hydration status: acceptable

## 2021-07-15 VITALS — RESPIRATION RATE: 13 BRPM | WEIGHT: 180.5 LBS | BODY MASS INDEX: 31.98 KG/M2 | HEIGHT: 63 IN

## 2021-07-19 ENCOUNTER — TRANSCRIBE ORDERS (OUTPATIENT)
Dept: ADMINISTRATIVE | Facility: HOSPITAL | Age: 61
End: 2021-07-19

## 2021-07-19 DIAGNOSIS — Z12.31 SCREENING MAMMOGRAM FOR HIGH-RISK PATIENT: Primary | ICD-10-CM

## 2021-08-04 ENCOUNTER — HOSPITAL ENCOUNTER (OUTPATIENT)
Dept: MAMMOGRAPHY | Facility: HOSPITAL | Age: 61
Discharge: HOME | End: 2021-08-04
Admitting: FAMILY MEDICINE

## 2021-08-04 DIAGNOSIS — Z12.31 SCREENING MAMMOGRAM FOR HIGH-RISK PATIENT: ICD-10-CM

## 2021-08-04 PROCEDURE — 77063 BREAST TOMOSYNTHESIS BI: CPT

## 2021-08-04 PROCEDURE — 77067 SCR MAMMO BI INCL CAD: CPT

## 2021-10-28 ENCOUNTER — OFFICE VISIT (OUTPATIENT)
Dept: OBSTETRICS AND GYNECOLOGY | Facility: CLINIC | Age: 61
End: 2021-10-28

## 2021-10-28 VITALS
HEIGHT: 64 IN | WEIGHT: 173 LBS | SYSTOLIC BLOOD PRESSURE: 133 MMHG | HEART RATE: 75 BPM | DIASTOLIC BLOOD PRESSURE: 79 MMHG | BODY MASS INDEX: 29.53 KG/M2

## 2021-10-28 DIAGNOSIS — Z01.419 ENCOUNTER FOR GYNECOLOGICAL EXAMINATION WITHOUT ABNORMAL FINDING: Primary | ICD-10-CM

## 2021-10-28 LAB
GLUCOSE UR STRIP-MCNC: NEGATIVE MG/DL
PROT UR STRIP-MCNC: NEGATIVE MG/DL

## 2021-10-28 PROCEDURE — G0123 SCREEN CERV/VAG THIN LAYER: HCPCS | Performed by: NURSE PRACTITIONER

## 2021-10-28 PROCEDURE — 81002 URINALYSIS NONAUTO W/O SCOPE: CPT | Performed by: NURSE PRACTITIONER

## 2021-10-28 PROCEDURE — 99386 PREV VISIT NEW AGE 40-64: CPT | Performed by: NURSE PRACTITIONER

## 2021-10-28 PROCEDURE — 88175 CYTOPATH C/V AUTO FLUID REDO: CPT | Performed by: NURSE PRACTITIONER

## 2021-10-28 NOTE — PROGRESS NOTES
"  HPI:   61 y.o..Presents for well woman exam. Contraception or HRT: Post menopausal  Menses:  No VB  Pain:  None  Last pap normal   Pt has no complaints today.    Past Medical History:   Diagnosis Date   • Breast cyst    • Breast nodule    • Chest pain    • GERD (gastroesophageal reflux disease)    • Migraines       Past Surgical History:   Procedure Laterality Date   • COLONOSCOPY     • COLONOSCOPY N/A 2021    Procedure: COLONOSCOPY;  Surgeon: Bc Hackett MD;  Location: [facility] ENDOSCOPY;  Service: General;  Laterality: N/A;  hemorrhoids   • CYST REMOVAL      RIGHT HAND/LEFT ARM      Family History   Problem Relation Age of Onset   • Diabetes Father    • Diabetes Paternal Grandfather    • Pancreatic cancer Brother 65   • Malig Hyperthermia Neg Hx         PCP: does manage PMHx and preventative labs      PHYSICAL EXAM:  /79   Pulse 75   Ht 161.3 cm (63.5\")   Wt 78.5 kg (173 lb)   BMI 30.16 kg/m²   General- NAD, alert and oriented, appropriate  Psych- Normal mood, good memory  Neck- No masses, no thyroid enlargement  Lymphatic- No palpable neck, axillary, or groin nodes  CV- Regular rhythm, no murnurs  Resp- CTA to bases, no wheezes  Abdomen- Soft, non distended, non tender, no masses  Breast left-  Bilaterally symmetrical, no masses, non tender, no nipple discharge  Breast right- Bilaterally symmetrical, no masses, non tender, no nipple discharge  External genitalia- Normal female, no lesions  Urethra/meatus- Normal, no masses, non tender, no prolapse  Bladder- Normal, no masses, non tender, no prolapse  Vagina- Normal, atrophy, no lesions, no discharge, no prolapse  Cvx- Normal, no lesions, no discharge, No cervical motion tenderness  Uterus- Normal size, shape & consistency.  Non tender, mobile, & no prolapse  Adnexa- No mass, non tender  Anus/Rectum/Perineum- Not performed  Ext- No edema, no cyanosis    Skin- No lesions, no rashes, no acanthosis nigricans    ASSESSMENT and PLAN:  " WWE    Diagnoses and all orders for this visit:    1. Encounter for gynecological examination without abnormal finding (Primary)  -     POC Urinalysis Dipstick  -     IGP,rfx Aptima HPV All Pth      Glucose, UA   Date Value Ref Range Status   10/28/2021 Negative Negative, 1000 mg/dL (3+) mg/dL Final      Protein, POC   Date Value Ref Range Status   10/28/2021 Negative Negative mg/dL Final        Counseling:     HRT R/B/A/SE/E all options including non-FDA approved options reviewed    Preventative:   BREAST HEALTH- Monthly self breast exam importance and how to reviewed. MMG and/or MRI (prn) reviewed per society guidelines and her individual history. Screen: Updated today.  CERVICAL CANCER Screening- Reviewed current ASCCP guidelines for screening w and wo cotest HPV, age specific.  Screen: Updated today.  COLON CANCER Screening- Reviewed current medical society guidelines and options.  Screen:  Already up to date.  BONE HEALTH- Reviewed current medical society guidelines and options for testing, calcium and vit D intake.  Weight bearing exercise.  DEXA: Already up to date.  Follow up PCP/Specialist PMHx and Labs  Myriad: Does not qualify.    She understands the importance of having any ordered tests to be performed in a timely fashion.  The risks of not performing them include, but are not limited to, advanced cancer stages, bone loss from osteoporosis and/or subsequent increase in morbidity and/or mortality.  She is encouraged to review her results online and/or contact or office if she has questions.     Follow Up:  Return for as needed.    Leticia Junior, APRN  10/28/2021

## 2021-11-02 LAB
CONV .: NORMAL
CYTOLOGIST CVX/VAG CYTO: NORMAL
CYTOLOGY CVX/VAG DOC CYTO: NORMAL
CYTOLOGY CVX/VAG DOC THIN PREP: NORMAL
DX ICD CODE: NORMAL
HIV 1 & 2 AB SER-IMP: NORMAL
OTHER STN SPEC: NORMAL
STAT OF ADQ CVX/VAG CYTO-IMP: NORMAL

## 2022-03-23 ENCOUNTER — TRANSCRIBE ORDERS (OUTPATIENT)
Dept: ADMINISTRATIVE | Facility: HOSPITAL | Age: 62
End: 2022-03-23

## 2022-03-23 DIAGNOSIS — M85.80 OTHER SPECIFIED DISORDERS OF BONE DENSITY AND STRUCTURE, UNSPECIFIED SITE: Primary | ICD-10-CM

## 2022-06-02 ENCOUNTER — HOSPITAL ENCOUNTER (OUTPATIENT)
Dept: BONE DENSITY | Facility: HOSPITAL | Age: 62
Discharge: HOME | End: 2022-06-02
Admitting: FAMILY MEDICINE

## 2022-06-02 DIAGNOSIS — M85.80 OTHER SPECIFIED DISORDERS OF BONE DENSITY AND STRUCTURE, UNSPECIFIED SITE: ICD-10-CM

## 2022-06-02 PROCEDURE — 77080 DXA BONE DENSITY AXIAL: CPT

## 2022-09-07 ENCOUNTER — TRANSCRIBE ORDERS (OUTPATIENT)
Dept: ADMINISTRATIVE | Facility: HOSPITAL | Age: 62
End: 2022-09-07

## 2022-09-07 DIAGNOSIS — Z12.31 SCREENING MAMMOGRAM FOR HIGH-RISK PATIENT: Primary | ICD-10-CM

## 2022-11-23 ENCOUNTER — HOSPITAL ENCOUNTER (OUTPATIENT)
Dept: MAMMOGRAPHY | Facility: HOSPITAL | Age: 62
Discharge: HOME | End: 2022-11-23
Admitting: FAMILY MEDICINE

## 2022-11-23 DIAGNOSIS — Z12.31 SCREENING MAMMOGRAM FOR HIGH-RISK PATIENT: ICD-10-CM

## 2022-11-23 PROCEDURE — 77063 BREAST TOMOSYNTHESIS BI: CPT

## 2022-11-23 PROCEDURE — 77067 SCR MAMMO BI INCL CAD: CPT

## 2024-01-03 ENCOUNTER — TRANSCRIBE ORDERS (OUTPATIENT)
Dept: ADMINISTRATIVE | Facility: HOSPITAL | Age: 64
End: 2024-01-03
Payer: MEDICARE

## 2024-01-03 DIAGNOSIS — Z12.31 SCREENING MAMMOGRAM, ENCOUNTER FOR: Primary | ICD-10-CM

## 2024-01-10 ENCOUNTER — HOSPITAL ENCOUNTER (OUTPATIENT)
Dept: MAMMOGRAPHY | Facility: HOSPITAL | Age: 64
Discharge: HOME | End: 2024-01-10
Admitting: FAMILY MEDICINE
Payer: MEDICARE

## 2024-01-10 DIAGNOSIS — Z12.31 SCREENING MAMMOGRAM, ENCOUNTER FOR: ICD-10-CM

## 2024-01-10 PROCEDURE — 77067 SCR MAMMO BI INCL CAD: CPT

## 2024-01-10 PROCEDURE — 77063 BREAST TOMOSYNTHESIS BI: CPT

## (undated) DEVICE — SOL IRRG H2O PL/BG 1000ML STRL

## (undated) DEVICE — Device: Brand: DEFENDO AIR/WATER/SUCTION AND BIOPSY VALVE

## (undated) DEVICE — COLON KIT: Brand: MEDLINE INDUSTRIES, INC.